# Patient Record
Sex: MALE | Race: WHITE | NOT HISPANIC OR LATINO | Employment: UNEMPLOYED | URBAN - NONMETROPOLITAN AREA
[De-identification: names, ages, dates, MRNs, and addresses within clinical notes are randomized per-mention and may not be internally consistent; named-entity substitution may affect disease eponyms.]

---

## 2023-10-06 PROBLEM — S62.337A CLOSED DISPLACED FRACTURE OF NECK OF FIFTH METACARPAL BONE OF LEFT HAND: Status: ACTIVE | Noted: 2023-10-06

## 2023-10-06 RX ORDER — HYDROCODONE BITARTRATE AND ACETAMINOPHEN 5; 325 MG/1; MG/1
TABLET ORAL
COMMUNITY

## 2023-10-12 ENCOUNTER — ANCILLARY PROCEDURE (OUTPATIENT)
Dept: RADIOLOGY | Facility: CLINIC | Age: 23
End: 2023-10-12
Payer: COMMERCIAL

## 2023-10-12 ENCOUNTER — OFFICE VISIT (OUTPATIENT)
Dept: ORTHOPEDIC SURGERY | Facility: CLINIC | Age: 23
End: 2023-10-12

## 2023-10-12 DIAGNOSIS — M79.642 LEFT HAND PAIN: ICD-10-CM

## 2023-10-12 DIAGNOSIS — S62.337D CLOSED DISPLACED FRACTURE OF NECK OF FIFTH METACARPAL BONE OF LEFT HAND WITH ROUTINE HEALING, SUBSEQUENT ENCOUNTER: Primary | ICD-10-CM

## 2023-10-12 PROBLEM — M79.643 PAIN OF HAND: Status: ACTIVE | Noted: 2023-10-12

## 2023-10-12 PROCEDURE — 73130 X-RAY EXAM OF HAND: CPT | Mod: LEFT SIDE | Performed by: RADIOLOGY

## 2023-10-12 PROCEDURE — 99214 OFFICE O/P EST MOD 30 MIN: CPT | Performed by: SPECIALIST

## 2023-10-12 PROCEDURE — 73130 X-RAY EXAM OF HAND: CPT | Mod: LT

## 2023-10-12 PROCEDURE — 1036F TOBACCO NON-USER: CPT | Performed by: SPECIALIST

## 2023-10-12 ASSESSMENT — PAIN - FUNCTIONAL ASSESSMENT: PAIN_FUNCTIONAL_ASSESSMENT: NO/DENIES PAIN

## 2023-10-13 PROBLEM — M79.642 LEFT HAND PAIN: Status: ACTIVE | Noted: 2023-10-12

## 2023-10-13 NOTE — ASSESSMENT & PLAN NOTE
Assessment: Left hand fifth metacarpal neck fracture.  Approximately 6 weeks status post fracture.

## 2023-10-13 NOTE — PROGRESS NOTES
Assessment/Plan   Encounter Diagnoses:  Closed displaced fracture of neck of fifth metacarpal bone of left hand with routine healing, subsequent encounter    Left hand pain  Closed displaced fracture of neck of fifth metacarpal bone of left hand  Assessment: Left hand fifth metacarpal neck fracture.  Approximately 6 weeks status post fracture.       Subjective    Patient ID: Mata Pacheco is a 23 y.o. male.    Chief Complaint: Follow-up of the Left Hand     Last Surgery: No surgery found  Last Surgery Date: No surgery found    HPI  6 weeks status post 1/5 metacarpal neck fracture which is healing well.     OBJECTIVE: ORTHO EXAM  Left wrist/hand:  Skin healthy and intact  No gross swelling or ecchymosis  The fifth metacarpal neck is minimally tender to deep palpation.  Range of motion of the fifth metacarpal phalangeal joint is full     Volar flexion, dorsiflexion, pronation/supination without limitation  Radial and Ulnar Deviation full motion and no pain.  No tenderness to palpation over distal radius  No tenderness to palpation over distal ulna or TFCC  No tenderness to palpation over the scaphoid  ---  Negative Tinels Median Nerve  Negative Durkan Median Nerve  ---  Negative Tinel Ulnar Nerve at [place]  Negative Phalens Ulnar Nerve at [place]     Neurovascular exam normal distally    IMAGE RESULTS:  XR fingers  and hand  Narrative: Interpreted By:  HAYDEE ZIMMER MD  MRN: [id number]  Patient Name: MATA PACHECO     STUDY:  Left HAND  MIN 3 VIEWS;  9/28/2023 2:32 pm     INDICATION:  Healing Fracture  S62.337A: Closed displaced fracture of neck of  fifth metacarpal bone of left hand, initial encounter.     COMPARISON:  09/17/2023     ACCESSION NUMBER(S):  [id number]     ORDERING CLINICIAN:  JENNIFER SOTOMAYOR     FINDINGS:  Bony structures:  Views were obtained through an overlying splint. A  hairline fracture at the base of the head of the 5th metacarpal  remains evident and similar to the previous exam.  There is no  significant callus formation.  The remaining bony structures are intact.     Joint spaces:  Maintained     Soft tissues:  Unremarkable without significant edema or radiodense  foreign body     Other:  None significant     Impression: Redemonstration of the 5th metacarpal fracture.     MACRO:      ULTRASOUND  Wrist Ultrasound    DIAGNOSTIC ULTRASOUND REPORT FINAL: Left HAND AND WRIST  Sonographer: Freddy Palumbo MD  Procedure: Ultrasound, extremity, nonvascular, real-time, COMPLETE, anatomic specific.  Indication: Wrist Pain  Technique: B-Mode Ultrasound Examination performed using 8-13 MHz linear transducer with [employer]  STUDY TYPE:  1. ULTRASOUND EXTREMITY  2. REAL TIME WITH IMAGE DOCUMENTATION  3. NON-VASCULAR  4. COMPLETE STUDY  Site: LEFT HAND AND WRIST  Live ultrasound was performed with the patient's HAND AND WRIST and PERMANENTLY documented. COMPLETE and FINAL ULTRASOUND REPORT of the patient's  HAND AND WRIST. . I personally performed the ultrasound and reviewed the findings. These show:    Live ultrasound was performed of the patient's HAND AND WRIST that shows intact Extensor digitorum communis, Extensor digiti minimi, Extensor indicis proprius, Extensor Pollicus Longus, Flexor Pollicus Longus, Flexor digitorum profundus, Flexor digitorum superficialis tendon with the THENAR and HYPOTHENAR muscle fibers showing normal striations. NO FLUID NOTED WITHIN THE CARPAL TUNNEL, THE MEDIAN NERVE SHOWS NORMAL PATTERN OF ECHOGENICITY. The median n. remains swollen.  The tendons appear normal in appearance and size as they pass the styloid process. No fracture is appreciated. Nonsterile gloves were used for this procedure  gauze pads were then used to clean the area after the procedure was compete. The patient tolerated the procedure well.  The fifth metacarpal neck is remodeling and healing appropriately.  No fluid is seen in the fifth metacarpal phalangeal joint.    PROCEDURES  None    Orders Placed  This Encounter    XR hand left 3+ views    XR fingers 2+ views bilateral    Point of Care Ultrasound

## 2023-11-09 ENCOUNTER — OFFICE VISIT (OUTPATIENT)
Dept: ORTHOPEDIC SURGERY | Facility: CLINIC | Age: 23
End: 2023-11-09

## 2023-11-09 ENCOUNTER — ANCILLARY PROCEDURE (OUTPATIENT)
Dept: RADIOLOGY | Facility: CLINIC | Age: 23
End: 2023-11-09
Payer: COMMERCIAL

## 2023-11-09 DIAGNOSIS — M79.642 LEFT HAND PAIN: ICD-10-CM

## 2023-11-09 PROCEDURE — 73130 X-RAY EXAM OF HAND: CPT | Mod: LT

## 2023-11-09 PROCEDURE — 76882 US LMTD JT/FCL EVL NVASC XTR: CPT | Performed by: SPECIALIST

## 2023-11-09 PROCEDURE — 1036F TOBACCO NON-USER: CPT | Performed by: SPECIALIST

## 2023-11-09 PROCEDURE — 73130 X-RAY EXAM OF HAND: CPT | Mod: LEFT SIDE | Performed by: RADIOLOGY

## 2023-11-09 PROCEDURE — 99214 OFFICE O/P EST MOD 30 MIN: CPT | Performed by: SPECIALIST

## 2023-11-09 ASSESSMENT — PAIN - FUNCTIONAL ASSESSMENT: PAIN_FUNCTIONAL_ASSESSMENT: 0-10

## 2023-11-09 ASSESSMENT — PAIN SCALES - GENERAL: PAINLEVEL_OUTOF10: 0 - NO PAIN

## 2023-11-09 NOTE — PROGRESS NOTES
Assessment/Plan   Encounter Diagnoses:  Left hand pain  Closed displaced fracture of neck of fifth metacarpal bone of left hand  Assessment: Fifth metacarpal neck fracture left hand with excellent healing.  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  He living he is having no pain.    Plan:  Continue to use the protective brace for batting.  Continue to do the range of motion and strengthening of his hand.  Follow-up with me on a as needed basis.       Subjective    Patient ID: Mata Pacheco is a 23 y.o. male.    Chief Complaint: Pain of the Left Hand     Last Surgery: No surgery found  Last Surgery Date: No surgery found    HPI  23-year-old  who was hit with a ball over the fifth metacarpal head and neck area.  He is about a month status post his injury of 10/12/2023.  He states he is returned to full activities and baseball and is having no pain.  OBJECTIVE: ORTHO EXAM  Left wrist/hand:  Skin healthy and intact  He demonstrates no swelling over the fifth metacarpal phalangeal joint area.  He is completely nontender even to deep palpation over the metacarpal neck.  He demonstrates a full range of motion.  Even with some rotational stress he has no pain.         Neurovascular exam normal distally    IMAGE RESULTS:  XR hand left 3+ views  Narrative: Interpreted By:  Gigi Bernal,   STUDY:  XR HAND LEFT 3+ VIEWS      INDICATION:  Signs/Symptoms:LEFT 5TH METACARPAL FRACTURE.      COMPARISON:  September 28          ACCESSION NUMBER(S):  [id number]      ORDERING CLINICIAN:  FREDDY SOTOMAYOR      FINDINGS:  3 views left hand no longer visualize the 5th metacarpal fracture.  Alignment normal.      Impression: Normal left hand radiographs.      Signed by: Gigi Bernal 10/13/2023 5:43 PM  Dictation workstation:   [id number]      ULTRASOUND  Wrist Ultrasound    DIAGNOSTIC ULTRASOUND REPORT FINAL: Left HAND AND WRIST  Sonographer: Freddy Sotomayor MD  Procedure: Ultrasound, extremity,  nonvascular, real-time, COMPLETE, anatomic specific.  Indication: Wrist Pain  Technique: B-Mode Ultrasound Examination performed using 8-13 MHz linear transducer with [employer]  STUDY TYPE:  1. ULTRASOUND EXTREMITY  2. REAL TIME WITH IMAGE DOCUMENTATION  3. NON-VASCULAR  4. COMPLETE STUDY  Site: LEFT HAND AND WRIST  Live ultrasound was performed with the patient's HAND AND WRIST and PERMANENTLY documented. COMPLETE and FINAL ULTRASOUND REPORT of the patient's  HAND AND WRIST. . I personally performed the ultrasound and reviewed the findings. These show:    Live ultrasound was performed of the patient's HAND AND WRIST that shows intact Extensor digitorum communis, Extensor digiti minimi, Extensor indicis proprius, Extensor Pollicus Longus, Flexor Pollicus Longus, Flexor digitorum profundus, Flexor digitorum superficialis tendon with the THENAR and HYPOTHENAR muscle fibers showing normal striations. NO FLUID NOTED WITHIN THE CARPAL TUNNEL, THE MEDIAN NERVE SHOWS NORMAL PATTERN OF ECHOGENICITY. The median n. remains swollen.  The tendons appear normal in appearance and size as they pass the styloid process. No fracture is appreciated. Nonsterile gloves were used for this procedure  gauze pads were then used to clean the area after the procedure was compete. The patient tolerated the procedure well. [Careful imaging of the fifth metacarpal neck area shows remodeling and healing of a previously noted impaction neck fracture.  This is still visible but getting difficult to see sonographically.    Procedures     Orders Placed This Encounter    XR hand left 3+ views    Point of Care Ultrasound    Follow Up In Orthopaedic Surgery

## 2023-11-09 NOTE — ASSESSMENT & PLAN NOTE
Assessment: Fifth metacarpal neck fracture left hand with excellent healing.  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  He living he is having no pain.    Plan:  Continue to use the protective brace for batting.  Continue to do the range of motion and strengthening of his hand.  Follow-up with me on a as needed basis.